# Patient Record
Sex: MALE | Race: WHITE | URBAN - METROPOLITAN AREA
[De-identification: names, ages, dates, MRNs, and addresses within clinical notes are randomized per-mention and may not be internally consistent; named-entity substitution may affect disease eponyms.]

---

## 2022-08-22 ENCOUNTER — APPOINTMENT (RX ONLY)
Dept: URBAN - METROPOLITAN AREA CLINIC 11 | Facility: CLINIC | Age: 37
Setting detail: DERMATOLOGY
End: 2022-08-22

## 2022-08-22 DIAGNOSIS — L81.8 OTHER SPECIFIED DISORDERS OF PIGMENTATION: ICD-10-CM

## 2022-08-22 DIAGNOSIS — L90.5 SCAR CONDITIONS AND FIBROSIS OF SKIN: ICD-10-CM

## 2022-08-22 PROCEDURE — ? COUNSELING - SCAR

## 2022-08-22 PROCEDURE — ? NARRATIVE HISTORY

## 2022-08-22 PROCEDURE — ? COUNSELING - PIH

## 2022-08-22 PROCEDURE — ? PHOTOS OBTAINED

## 2022-08-22 PROCEDURE — Z1100: HCPCS

## 2022-08-22 ASSESSMENT — LOCATION ZONE DERM: LOCATION ZONE: ARM

## 2022-08-22 ASSESSMENT — LOCATION DETAILED DESCRIPTION DERM: LOCATION DETAILED: LEFT ANTERIOR SHOULDER

## 2022-08-22 ASSESSMENT — SCAR ASSESSEMENT OVERALL: SCAR ASSESSMENT: 1.5 (FLAT SCAR, ERYTHEMA ONLY)

## 2022-08-22 ASSESSMENT — LOCATION SIMPLE DESCRIPTION DERM: LOCATION SIMPLE: LEFT SHOULDER
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
## 2022-08-22 NOTE — PROCEDURE: PHOTOS OBTAINED
Follow-Up Increment: 6
Detail Level: Simple
Follow-Up Interval: months
Follow-Up For Additional Photos?: no
Photographed Locations: Photos were obtained. \\n
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
